# Patient Record
Sex: FEMALE | Race: WHITE | NOT HISPANIC OR LATINO | Employment: UNEMPLOYED | URBAN - METROPOLITAN AREA
[De-identification: names, ages, dates, MRNs, and addresses within clinical notes are randomized per-mention and may not be internally consistent; named-entity substitution may affect disease eponyms.]

---

## 2018-01-19 ENCOUNTER — HOSPITAL ENCOUNTER (EMERGENCY)
Facility: HOSPITAL | Age: 38
Discharge: HOME | End: 2018-01-19
Attending: EMERGENCY MEDICINE
Payer: COMMERCIAL

## 2018-01-19 VITALS
HEIGHT: 61 IN | SYSTOLIC BLOOD PRESSURE: 122 MMHG | BODY MASS INDEX: 17.37 KG/M2 | OXYGEN SATURATION: 100 % | RESPIRATION RATE: 16 BRPM | TEMPERATURE: 99 F | DIASTOLIC BLOOD PRESSURE: 60 MMHG | WEIGHT: 92 LBS | HEART RATE: 80 BPM

## 2018-01-19 DIAGNOSIS — S00.93XA HEAD CONTUSION: Primary | ICD-10-CM

## 2018-01-19 DIAGNOSIS — S06.0X0A CONCUSSION WITHOUT LOSS OF CONSCIOUSNESS, INITIAL ENCOUNTER: ICD-10-CM

## 2018-01-19 LAB
B-HCG UR QL: NEGATIVE
CTP QC/QA: YES

## 2018-01-19 PROCEDURE — 99284 EMERGENCY DEPT VISIT MOD MDM: CPT | Mod: 25

## 2018-01-19 PROCEDURE — 81025 URINE PREGNANCY TEST: CPT | Performed by: EMERGENCY MEDICINE

## 2018-01-19 NOTE — ED PROVIDER NOTES
"Encounter Date: 1/19/2018    SCRIBE #1 NOTE: I, Breann Arnett, am scribing for, and in the presence of,  Lamonte Lamar MD. I have scribed the following portions of the note - Other sections scribed: HPI, ROS, and Physical Exam.       History     Chief Complaint   Patient presents with    Head Injury     was looking in chest when top came down on head approx 45 min ago.  pt had spontanious nose bleed without hitting nose.  and feels "drunk".  denies loc.     CC: Head Injury    HPI: This 37 y.o female presents to the ED c/o acute, constant, 6/10 pain to her parietal head that began 45 minutes prior to arrival.  Patient reports a large, heavy cedar drawer falling onto the top of her head as she attempted to retrieve an item.  Patient reports after the incident occurred, she has been "feeling off, which she thinks may still be a result of the shock of the incident.  Patient also reports an episode of epistaxis after the incident as well.  Patient denies loss of consciousness, neck pain, nausea, emesis, visual changes, abdominal pain, extremity numbness, extremity weakness, back pain, paresthesias, or any other associated symptoms.  No prior tx.  No alleviating factors.       The history is provided by the patient. No  was used.     Review of patient's allergies indicates:  No Known Allergies  History reviewed. No pertinent past medical history.  History reviewed. No pertinent surgical history.  History reviewed. No pertinent family history.  Social History   Substance Use Topics    Smoking status: Current Every Day Smoker    Smokeless tobacco: Not on file    Alcohol use No     Review of Systems   Constitutional: Negative for chills and fever.   HENT: Positive for nosebleeds. Negative for ear pain and sore throat.    Eyes: Negative for pain and visual disturbance.   Respiratory: Negative for cough and shortness of breath.    Cardiovascular: Negative for chest pain.   Gastrointestinal: Negative for " abdominal pain, diarrhea, nausea and vomiting.   Genitourinary: Negative for dysuria.   Musculoskeletal: Negative for back pain and neck pain.   Skin: Negative for rash.   Neurological: Positive for weakness. Negative for syncope and headaches.        (+) head trauma  (-) loss of consciousness       Physical Exam     Initial Vitals [01/19/18 1132]   BP Pulse Resp Temp SpO2   (!) 112/56 75 16 98.4 °F (36.9 °C) 97 %      MAP       74.67         Physical Exam    Nursing note and vitals reviewed.  Constitutional: She appears well-developed and well-nourished. She is not diaphoretic. No distress.   HENT:   Head: Normocephalic.   Nose: No nasal deformity, septal deviation or nasal septal hematoma.   Patient has dried blood to the anterior right nares. No active bleeding    +2cm scalp hematoma   Eyes: EOM are normal.   Neck: Neck supple.   Cardiovascular: Normal rate and regular rhythm.   Pulmonary/Chest: Breath sounds normal. No respiratory distress. She has no wheezes. She has no rhonchi. She has no rales. She exhibits no tenderness.   Abdominal: Soft. Normal appearance and bowel sounds are normal. She exhibits no distension. There is no tenderness. There is no rebound and no guarding.   Musculoskeletal: Normal range of motion. She exhibits no edema.   Neurological: She is alert and oriented to person, place, and time.   Skin: Skin is warm and dry.   Psychiatric: She has a normal mood and affect.         ED Course   Procedures  Labs Reviewed   POCT URINE PREGNANCY          X-Rays:   Independently Interpreted Readings:   Head CT: Head CT: no ICH, no mass effect, no mass lesion, no skull fracture, no hydrocephalus      Medical Decision Making:   History:   Old Medical Records: I decided to obtain old medical records.  Initial Assessment:   Head contusion without loss of consciousness  Differential Diagnosis:   Scalp contusion  Skull fracture  Intracranial bleed    Independently Interpreted Test(s):   I have ordered and  "independently interpreted X-rays - see prior notes.  Clinical Tests:   Lab Tests: Ordered and Reviewed  Radiological Study: Ordered and Reviewed  ED Management:  Patient afebrile, no acute distress.  She has no focal neurological deficits.  There is no active bleeding from her nose.  She has a scalp hematoma but no laceration.  She is Nexus criteria negative.  She has no tenderness of her facial bones.  She has no cervical spine tenderness to palpation.  CT of the brain is negative for intracranial injuries.  Patient's symptoms of feeling "out of it" likely due to mild concussion. Counseled on supportive care and dicussed extensively concerning symptoms for which to return to ER and the importance of follow up . Patient expressed understanding and agreement with treatment plan.             Scribe Attestation:   Scribe #1: I performed the above scribed service and the documentation accurately describes the services I performed. I attest to the accuracy of the note.    Attending Attestation:           Physician Attestation for Scribe:  Physician Attestation Statement for Scribe #1: I, Lamonte Lamar MD, reviewed documentation, as scribed by Breann Arnett in my presence, and it is both accurate and complete.                 ED Course      Clinical Impression:   The primary encounter diagnosis was Head contusion. A diagnosis of Concussion without loss of consciousness, initial encounter was also pertinent to this visit.    Disposition:   Disposition: Discharged  Condition: Stable                        Lamonte Lamar MD  01/19/18 1618    "

## 2018-01-19 NOTE — ED TRIAGE NOTES
"Pt arrived to ER with complaints of " a cedar chest top fell on my head and my nose was bleeding and my eyes watered". Pt reports dizziness, denies loss of consciousness, nausea or vomiting. Pt rates the pain as 7/10 at this time with light palpation. Pt denies complaints of a headache.   "

## 2018-01-19 NOTE — DISCHARGE INSTRUCTIONS
The CT of your brain did not have any intracranial injuries. You have symptoms of a mild concussion. Stay awake for the remainder of the day, but you can sleep tonight like normal. Return to ER for vision changes, numbness, weakness, or any new or worsening symptoms

## 2019-11-18 ENCOUNTER — HOSPITAL ENCOUNTER (EMERGENCY)
Facility: HOSPITAL | Age: 39
Discharge: HOME | End: 2019-11-18
Attending: EMERGENCY MEDICINE
Payer: COMMERCIAL

## 2019-11-18 VITALS
DIASTOLIC BLOOD PRESSURE: 78 MMHG | BODY MASS INDEX: 19.14 KG/M2 | TEMPERATURE: 98 F | RESPIRATION RATE: 20 BRPM | SYSTOLIC BLOOD PRESSURE: 118 MMHG | WEIGHT: 104 LBS | HEIGHT: 62 IN | HEART RATE: 95 BPM

## 2019-11-18 DIAGNOSIS — R07.9 CHEST PAIN: ICD-10-CM

## 2019-11-18 DIAGNOSIS — I47.10 SVT (SUPRAVENTRICULAR TACHYCARDIA): Primary | ICD-10-CM

## 2019-11-18 LAB
ALBUMIN SERPL BCP-MCNC: 5.1 G/DL (ref 3.5–5.2)
ALP SERPL-CCNC: 82 U/L (ref 55–135)
ALT SERPL W/O P-5'-P-CCNC: 18 U/L (ref 10–44)
ANION GAP SERPL CALC-SCNC: 11 MMOL/L (ref 8–16)
AST SERPL-CCNC: 15 U/L (ref 10–40)
B-HCG UR QL: NEGATIVE
BASOPHILS # BLD AUTO: 0.05 K/UL (ref 0–0.2)
BASOPHILS NFR BLD: 0.6 % (ref 0–1.9)
BILIRUB SERPL-MCNC: 0.5 MG/DL (ref 0.1–1)
BNP SERPL-MCNC: 26 PG/ML (ref 0–99)
BUN SERPL-MCNC: 8 MG/DL (ref 6–20)
CALCIUM SERPL-MCNC: 9.8 MG/DL (ref 8.7–10.5)
CHLORIDE SERPL-SCNC: 102 MMOL/L (ref 95–110)
CO2 SERPL-SCNC: 25 MMOL/L (ref 23–29)
CREAT SERPL-MCNC: 0.7 MG/DL (ref 0.5–1.4)
CTP QC/QA: YES
DIFFERENTIAL METHOD: NORMAL
EOSINOPHIL # BLD AUTO: 0.1 K/UL (ref 0–0.5)
EOSINOPHIL NFR BLD: 1.5 % (ref 0–8)
ERYTHROCYTE [DISTWIDTH] IN BLOOD BY AUTOMATED COUNT: 12.2 % (ref 11.5–14.5)
EST. GFR  (AFRICAN AMERICAN): >60 ML/MIN/1.73 M^2
EST. GFR  (NON AFRICAN AMERICAN): >60 ML/MIN/1.73 M^2
GLUCOSE SERPL-MCNC: 120 MG/DL (ref 70–110)
HCT VFR BLD AUTO: 45.2 % (ref 37–48.5)
HGB BLD-MCNC: 14.8 G/DL (ref 12–16)
IMM GRANULOCYTES # BLD AUTO: 0.02 K/UL (ref 0–0.04)
IMM GRANULOCYTES NFR BLD AUTO: 0.2 % (ref 0–0.5)
INR PPP: 1 (ref 0.8–1.2)
LYMPHOCYTES # BLD AUTO: 2.3 K/UL (ref 1–4.8)
LYMPHOCYTES NFR BLD: 26.2 % (ref 18–48)
MCH RBC QN AUTO: 29.1 PG (ref 27–31)
MCHC RBC AUTO-ENTMCNC: 32.7 G/DL (ref 32–36)
MCV RBC AUTO: 89 FL (ref 82–98)
MONOCYTES # BLD AUTO: 0.6 K/UL (ref 0.3–1)
MONOCYTES NFR BLD: 6.9 % (ref 4–15)
NEUTROPHILS # BLD AUTO: 5.6 K/UL (ref 1.8–7.7)
NEUTROPHILS NFR BLD: 64.6 % (ref 38–73)
NRBC BLD-RTO: 0 /100 WBC
PLATELET # BLD AUTO: 260 K/UL (ref 150–350)
PMV BLD AUTO: 11.7 FL (ref 9.2–12.9)
POTASSIUM SERPL-SCNC: 3.6 MMOL/L (ref 3.5–5.1)
PROT SERPL-MCNC: 8.2 G/DL (ref 6–8.4)
PROTHROMBIN TIME: 10.7 SEC (ref 9–12.5)
RBC # BLD AUTO: 5.09 M/UL (ref 4–5.4)
SODIUM SERPL-SCNC: 138 MMOL/L (ref 136–145)
T4 FREE SERPL-MCNC: 1.05 NG/DL (ref 0.71–1.51)
TROPONIN I SERPL DL<=0.01 NG/ML-MCNC: <0.006 NG/ML (ref 0–0.03)
TSH SERPL DL<=0.005 MIU/L-ACNC: 0.43 UIU/ML (ref 0.4–4)
WBC # BLD AUTO: 8.66 K/UL (ref 3.9–12.7)

## 2019-11-18 PROCEDURE — 93010 EKG 12-LEAD: ICD-10-PCS | Mod: ,,, | Performed by: INTERNAL MEDICINE

## 2019-11-18 PROCEDURE — 84484 ASSAY OF TROPONIN QUANT: CPT

## 2019-11-18 PROCEDURE — 63600175 PHARM REV CODE 636 W HCPCS: Performed by: EMERGENCY MEDICINE

## 2019-11-18 PROCEDURE — 84443 ASSAY THYROID STIM HORMONE: CPT

## 2019-11-18 PROCEDURE — 84439 ASSAY OF FREE THYROXINE: CPT

## 2019-11-18 PROCEDURE — 99285 EMERGENCY DEPT VISIT HI MDM: CPT | Mod: 25

## 2019-11-18 PROCEDURE — 85610 PROTHROMBIN TIME: CPT

## 2019-11-18 PROCEDURE — 83880 ASSAY OF NATRIURETIC PEPTIDE: CPT

## 2019-11-18 PROCEDURE — 85025 COMPLETE CBC W/AUTO DIFF WBC: CPT

## 2019-11-18 PROCEDURE — 93010 ELECTROCARDIOGRAM REPORT: CPT | Mod: ,,, | Performed by: INTERNAL MEDICINE

## 2019-11-18 PROCEDURE — 80053 COMPREHEN METABOLIC PANEL: CPT

## 2019-11-18 PROCEDURE — 81025 URINE PREGNANCY TEST: CPT | Performed by: EMERGENCY MEDICINE

## 2019-11-18 PROCEDURE — 93005 ELECTROCARDIOGRAM TRACING: CPT

## 2019-11-18 PROCEDURE — 96360 HYDRATION IV INFUSION INIT: CPT

## 2019-11-18 PROCEDURE — 96361 HYDRATE IV INFUSION ADD-ON: CPT

## 2019-11-18 RX ADMIN — SODIUM CHLORIDE 1000 ML: 0.9 INJECTION, SOLUTION INTRAVENOUS at 01:11

## 2019-11-18 NOTE — ED PROVIDER NOTES
Encounter Date: 11/18/2019       History     Chief Complaint   Patient presents with    Shortness of Breath     SVT     HPI     39-year-old female with no reported past medical history presents with palpitations times 1 day.  Patient reports she has had this happen to her almost every other year over the last few years, and reports seen cardiologist and wearing a monitor however was unable to capture any significant events.  She reports smoking around a pack a day and is trying to quit, denies any significant caffeine intake, and otherwise reports she has been a little bit more anxious than normal, but nothing out of the ordinary.  She denies any leg swelling, history of blood clots, chest pain, shortness of breath, nausea/vomiting, fevers/chills, or any lightheadedness/dizziness.  Denies any further complaints.    Review of patient's allergies indicates:  No Known Allergies  No past medical history on file.  No past surgical history on file.  No family history on file.  Social History     Tobacco Use    Smoking status: Current Every Day Smoker   Substance Use Topics    Alcohol use: No    Drug use: No     Review of Systems   Constitutional: Negative.    HENT: Negative.    Eyes: Negative.    Respiratory: Negative.    Cardiovascular: Positive for palpitations.   Gastrointestinal: Negative.    Genitourinary: Negative.    Musculoskeletal: Negative.    Skin: Negative.    Neurological: Negative.        Physical Exam     Initial Vitals   BP Pulse Resp Temp SpO2   11/18/19 1254 11/18/19 1250 11/18/19 1254 11/18/19 1254 --   114/67 (!) 225 18 98.3 °F (36.8 °C)       MAP       --                Physical Exam    Constitutional: She appears well-developed and well-nourished. She is not diaphoretic. No distress.   HENT:   Head: Normocephalic and atraumatic.   Nose: Nose normal.   Eyes: EOM are normal. Pupils are equal, round, and reactive to light.   Neck: Normal range of motion. No JVD present.   Cardiovascular: Regular  rhythm and normal heart sounds.   No murmur heard.  Pulmonary/Chest: Breath sounds normal. No stridor. No respiratory distress. She has no wheezes. She has no rales.   Abdominal: Soft. Bowel sounds are normal. She exhibits no distension. There is no tenderness.   Musculoskeletal: Normal range of motion. She exhibits no edema or tenderness.   Neurological: She is alert and oriented to person, place, and time. No cranial nerve deficit.   Skin: Skin is warm and dry. Capillary refill takes less than 2 seconds. No rash noted. No erythema.         ED Course   Procedures  Labs Reviewed   COMPREHENSIVE METABOLIC PANEL - Abnormal; Notable for the following components:       Result Value    Glucose 120 (*)     All other components within normal limits   CBC W/ AUTO DIFFERENTIAL   B-TYPE NATRIURETIC PEPTIDE   TROPONIN I   PROTIME-INR    Narrative:     Recoll. [id number] by MMK at 11/18/2019 13:41, reason: Insufficient   specimen   TSH   T4, FREE   POCT URINE PREGNANCY        ECG Results          EKG 12-lead (In process)  Result time 11/18/19 13:36:16    In process by Interface, Lab In [facility] (11/18/19 13:36:16)                 Narrative:    Test Reason : R07.9,    Vent. Rate : 115 BPM     Atrial Rate : 115 BPM     P-R Int : 120 ms          QRS Dur : 074 ms      QT Int : 304 ms       P-R-T Axes : 075 096 015 degrees     QTc Int : 420 ms    Sinus tachycardia  Rightward axis  Nonspecific ST abnormality  Abnormal ECG  When compared with ECG of 18-NOV-2019 12:42,  Significant changes have occurred    Referred By: System System           Confirmed By:                   In process by Interface, Lab In [facility] (11/18/19 13:26:37)                 Narrative:    Test Reason : R07.9,    Vent. Rate : 115 BPM     Atrial Rate : 115 BPM     P-R Int : 120 ms          QRS Dur : 074 ms      QT Int : 304 ms       P-R-T Axes : 075 096 015 degrees     QTc Int : 420 ms    Sinus tachycardia  Rightward axis  Nonspecific ST abnormality  Abnormal  ECG  When compared with ECG of 18-NOV-2019 12:42,  Significant changes have occurred    Referred By: System System           Confirmed By:                             Imaging Results          X-Ray Chest PA And Lateral (Final result)  Result time 11/18/19 15:13:42    Final result by Manuel Boyce MD (11/18/19 15:13:42)                 Impression:      No acute process.      Electronically signed by: Manuel Boyce MD  Date:    11/18/2019  Time:    15:13             Narrative:    EXAMINATION:  XR CHEST PA AND LATERAL    CLINICAL HISTORY:  Chest Pain;    TECHNIQUE:  PA and lateral views of the chest were performed.    COMPARISON:  None    FINDINGS:  Monitoring EKG leads are present.  The trachea is unremarkable.  The cardiomediastinal silhouette is within normal limits.  The hilar structures are unremarkable.  The hemidiaphragms are within normal limits.  There is no evidence of free air beneath the hemidiaphragms.  There are no pleural effusions.  There is no evidence of a pneumothorax.  There is no evidence of pneumomediastinum.  No airspace opacity is present.  The osseous structures are unremarkable.                                                MDM:    39-year-old female with no reported past medical history presents with palpitations times 1 day. Physical exam remarkable for well appearing female, in mild distress, conversing with ease, abdomen soft, nt/nd, CTAB, no peripheral edema noted.  ED workup remarkable for EKG - SVT, trop - neg, BNP - neg, CBC/CMP - wnl, CXR - unremarkable, TSH/FT4 - wnl, neg preg.  Pt presentation consistent with SVT, resolving with vagal manuevers, with ED observation thereafter showing normal sinus rhythm, without further symptoms noted.  At this time given patient's history, physical exam, and ED workup do not suspect MI/ACS, PE, PTX, aortic dissection, pericarditis, PNA, or any further malignant cause. Cardiology referral placed.  Discussed diagnosis and further treatment with  patient, return precautions given and all questions answered.  Patient in understanding of plan.  Pt discharged to home improved and stable.                          Clinical Impression:       ICD-10-CM ICD-9-CM   1. SVT (supraventricular tachycardia) I47.1 427.89   2. Chest pain R07.9 786.50                             Jarred Gamino MD  11/18/19 9885

## 2019-11-18 NOTE — ED NOTES
Pt came to ER with c/o palpitations 30min PTA. Pt noted with a HR of 225 in triage. PT brought back to room 17 for immediate evaluation.  IV started and labs drawn. Pt provided with empty 10cc syringe and instructed to blow in order to vagal. PT now noted with HR of 113. Pt reports no palpitations with no complaints. VSS. Pt in no distress.

## 2019-11-20 ENCOUNTER — OFFICE VISIT (OUTPATIENT)
Dept: CARDIOLOGY | Facility: CLINIC | Age: 39
End: 2019-11-20
Payer: COMMERCIAL

## 2019-11-20 VITALS
BODY MASS INDEX: 19.23 KG/M2 | HEIGHT: 62 IN | WEIGHT: 104.5 LBS | HEART RATE: 77 BPM | SYSTOLIC BLOOD PRESSURE: 107 MMHG | OXYGEN SATURATION: 100 % | DIASTOLIC BLOOD PRESSURE: 58 MMHG

## 2019-11-20 DIAGNOSIS — I47.10 SVT (SUPRAVENTRICULAR TACHYCARDIA): Primary | ICD-10-CM

## 2019-11-20 PROCEDURE — 99204 OFFICE O/P NEW MOD 45 MIN: CPT | Mod: S$GLB,,, | Performed by: INTERNAL MEDICINE

## 2019-11-20 PROCEDURE — 99204 PR OFFICE/OUTPT VISIT, NEW, LEVL IV, 45-59 MIN: ICD-10-PCS | Mod: S$GLB,,, | Performed by: INTERNAL MEDICINE

## 2019-11-20 PROCEDURE — 99999 PR PBB SHADOW E&M-EST. PATIENT-LVL III: CPT | Mod: PBBFAC,,, | Performed by: INTERNAL MEDICINE

## 2019-11-20 PROCEDURE — 3008F PR BODY MASS INDEX (BMI) DOCUMENTED: ICD-10-PCS | Mod: CPTII,S$GLB,, | Performed by: INTERNAL MEDICINE

## 2019-11-20 PROCEDURE — 3008F BODY MASS INDEX DOCD: CPT | Mod: CPTII,S$GLB,, | Performed by: INTERNAL MEDICINE

## 2019-11-20 PROCEDURE — 99999 PR PBB SHADOW E&M-EST. PATIENT-LVL III: ICD-10-PCS | Mod: PBBFAC,,, | Performed by: INTERNAL MEDICINE

## 2019-11-20 NOTE — LETTER
November 20, 2019      Jarred Gamino MD  [address]  [facility] [address]           [facility]  [address]   [facility] [address]  Phone: [phone]          Patient: Jammie Torres   MR Number: [id number]   YOB: 1980   Date of Visit: 11/20/2019       Dear Dr. Jarred Gamino:    Thank you for referring Jammie Torres to me for evaluation. Attached you will find relevant portions of my assessment and plan of care.    If you have questions, please do not hesitate to call me. I look forward to following Jammie Torres along with you.    Sincerely,    Angela Alvarez MD    Enclosure  CC:  No Recipients    If you would like to receive this communication electronically, please contact [email or web address] or [phone] to request more information on [employer] access.    For providers and/or their staff who would like to refer a patient to [facility], please contact us through our one-stop-shop provider referral line, [facility] , at [phone].    If you feel you have received this communication in error or would no longer like to receive these types of communications, please e-mail [email or web address]

## 2019-11-20 NOTE — PROGRESS NOTES
CARDIOVASCULAR CONSULTATION    REASON FOR CONSULT:   Jammie Torres is a 39 y.o. female who presents for follow-up after recent presentation with supraventricular tachycardia.      HISTORY OF PRESENT ILLNESS:     Patient is a pleasant 39-year-old lady.  Recently came to the emergency room complaining of palpitations.  EKG was performed and I have personally reviewed the EKG.  It demonstrates supraventricular tachycardia, likely AVNRT.  As per the ER notes, it resolved with vagal maneuvers.  She is here for follow-up after that.  She states that she has been having it for the past 6-7 years.  Has had very few episodes.  Three years ago even were event monitor for a month and could not reproduce the symptoms.  This time her SVT was resolved with Valsalva maneuver.  She states that these episodes are very infrequent and normally they go away after an hour if she just lays down and waits for them to go away.  This time she came to the emergency room because she wanted to diagnose what it was.      PAST MEDICAL HISTORY:   History reviewed. No pertinent past medical history.    PAST SURGICAL HISTORY:   History reviewed. No pertinent surgical history.    ALLERGIES AND MEDICATION:   Review of patient's allergies indicates:  No Known Allergies     Medication List      as of November 20, 2019 10:12 AM     You have not been prescribed any medications.         SOCIAL HISTORY:     Social History     Socioeconomic History    Marital status:      Spouse name: Not on file    Number of children: Not on file    Years of education: Not on file    Highest education level: Not on file   Occupational History    Not on file   Social Needs    Financial resource strain: Not on file    Food insecurity:     Worry: Not on file     Inability: Not on file    Transportation needs:     Medical: Not on file     Non-medical: Not on file   Tobacco Use    Smoking status: Current Every Day Smoker   Substance and Sexual Activity     "Alcohol use: No    Drug use: No    Sexual activity: Not on file   Lifestyle    Physical activity:     Days per week: Not on file     Minutes per session: Not on file    Stress: Not on file   Relationships    Social connections:     Talks on phone: Not on file     Gets together: Not on file     Attends Mandaeism service: Not on file     Active member of club or organization: Not on file     Attends meetings of clubs or organizations: Not on file     Relationship status: Not on file   Other Topics Concern    Not on file   Social History Narrative    Not on file       FAMILY HISTORY:   History reviewed. No pertinent family history.    REVIEW OF SYSTEMS:   Review of Systems   Constitution: Negative.   HENT: Negative.    Eyes: Negative.    Cardiovascular: Positive for palpitations.   Respiratory: Negative.    Endocrine: Negative.    Hematologic/Lymphatic: Negative.    Skin: Negative.    Musculoskeletal: Negative.    Gastrointestinal: Negative.    Genitourinary: Negative.    Neurological: Negative.    Psychiatric/Behavioral: Negative.    Allergic/Immunologic: Negative.        A 10 point review of systems was performed and all the pertinent positives have been mentioned. Rest of review of systems was negative.        PHYSICAL EXAM:   There were no vitals filed for this visit. Body mass index is 19.02 kg/m².      Height: 5' 2" (157.5 cm)     Physical Exam   Constitutional: She is oriented to person, place, and time. She appears well-developed and well-nourished.   HENT:   Head: Normocephalic.   Eyes: Pupils are equal, round, and reactive to light.   Neck: Normal range of motion. Neck supple.   Cardiovascular: Normal rate and regular rhythm.   Pulmonary/Chest: Effort normal and breath sounds normal.   Abdominal: Soft. Normal appearance and bowel sounds are normal. There is no tenderness.   Musculoskeletal: Normal range of motion.   Neurological: She is alert and oriented to person, place, and time.   Skin: Skin is " warm.   Psychiatric: She has a normal mood and affect.         DATA:     Laboratory:  CBC:  Recent Labs   Lab 11/18/19  1256   WBC 8.66   Hemoglobin 14.8   Hematocrit 45.2   Platelets 260       CHEMISTRIES:  Recent Labs   Lab 11/18/19  1256   Glucose 120 H   Sodium 138   Potassium 3.6   BUN, Bld 8   Creatinine 0.7   eGFR if  >60   eGFR if non African American >60   Calcium 9.8       CARDIAC BIOMARKERS:  Recent Labs   Lab 11/18/19  1256   Troponin I <0.006       COAGS:  Recent Labs   Lab 11/18/19  1359   INR 1.0       LIPIDS/LFTS:  Recent Labs   Lab 11/18/19  1256   AST 15   ALT 18       No results found for: HGBA1C    TSH  Recent Labs   Lab 11/18/19  1256   TSH 0.434       The ASCVD Risk score (Ruth FRIAS Jr., et al., 2013) failed to calculate for the following reasons:    The systolic blood pressure is missing    Cannot find a previous HDL lab    Cannot find a previous total cholesterol lab           Cardiovascular Testing:    EKG: (personally reviewed tracing)  EKG recordings from emergency room were personally reviewed.  One EKG demonstrated supraventricular tachycardia.      ASSESSMENT AND PLAN     Supraventricular tachycardia:  We discussed various options for treatment of supraventricular tachycardia we discussed in detail the vagal maneuvers and how to perform Valsalva.  Patient states that at the current time she does not want to try any medications or ablation procedures for her symptoms since her symptoms are so infrequent.  She states that now she knows how to do the Valsalva and would like to try that if this occurs.  However if the symptoms get more frequent to a point where the affecting her quality of life she will give me a call and we will consider referral to electrophysiology at the [facility] for further therapy.    Check 2D echocardiogram    Follow-up in clinic in 6 months    Smoking cessation was emphasized.  Patient refused referral to smoking cessation Clinic at the current time  and stated that she would like to stop on her own              Thank you very much for involving me in the care of your patient.  Please do not hesitate to contact me if there are any questions.      Angela Alvarez MD, FAC, [facility]  Interventional Cardiologist, [facility].           This note was dictated with the help of speech recognition software.  There might be un-intended errors and/or substitutions.